# Patient Record
Sex: MALE | Race: WHITE | Employment: OTHER | URBAN - METROPOLITAN AREA
[De-identification: names, ages, dates, MRNs, and addresses within clinical notes are randomized per-mention and may not be internally consistent; named-entity substitution may affect disease eponyms.]

---

## 2023-02-10 ENCOUNTER — APPOINTMENT (OUTPATIENT)
Dept: GENERAL RADIOLOGY | Age: 28
End: 2023-02-10
Payer: COMMERCIAL

## 2023-02-10 ENCOUNTER — APPOINTMENT (OUTPATIENT)
Dept: CT IMAGING | Age: 28
End: 2023-02-10
Payer: COMMERCIAL

## 2023-02-10 ENCOUNTER — HOSPITAL ENCOUNTER (EMERGENCY)
Age: 28
Discharge: HOME | End: 2023-02-10
Attending: EMERGENCY MEDICINE
Payer: COMMERCIAL

## 2023-02-10 VITALS
SYSTOLIC BLOOD PRESSURE: 130 MMHG | WEIGHT: 165 LBS | RESPIRATION RATE: 21 BRPM | DIASTOLIC BLOOD PRESSURE: 73 MMHG | TEMPERATURE: 97.8 F | HEART RATE: 63 BPM | OXYGEN SATURATION: 98 %

## 2023-02-10 DIAGNOSIS — S80.851A FOREIGN BODY IN RIGHT LOWER EXTREMITY, INITIAL ENCOUNTER: ICD-10-CM

## 2023-02-10 DIAGNOSIS — T14.8XXA PUNCTURE WOUND: Primary | ICD-10-CM

## 2023-02-10 LAB
ANION GAP SERPL CALCULATED.3IONS-SCNC: 11 MMOL/L (ref 7–16)
BASOPHILS ABSOLUTE: 0.02 E9/L (ref 0–0.2)
BASOPHILS RELATIVE PERCENT: 0.3 % (ref 0–2)
BUN BLDV-MCNC: 11 MG/DL (ref 6–20)
CALCIUM SERPL-MCNC: 9 MG/DL (ref 8.6–10.2)
CHLORIDE BLD-SCNC: 103 MMOL/L (ref 98–107)
CO2: 25 MMOL/L (ref 22–29)
CREAT SERPL-MCNC: 0.9 MG/DL (ref 0.7–1.2)
EOSINOPHILS ABSOLUTE: 0.03 E9/L (ref 0.05–0.5)
EOSINOPHILS RELATIVE PERCENT: 0.4 % (ref 0–6)
GFR SERPL CREATININE-BSD FRML MDRD: >60 ML/MIN/1.73
GLUCOSE BLD-MCNC: 106 MG/DL (ref 74–99)
HCT VFR BLD CALC: 40 % (ref 37–54)
HEMOGLOBIN: 14.2 G/DL (ref 12.5–16.5)
IMMATURE GRANULOCYTES #: 0.02 E9/L
IMMATURE GRANULOCYTES %: 0.3 % (ref 0–5)
LYMPHOCYTES ABSOLUTE: 1.02 E9/L (ref 1.5–4)
LYMPHOCYTES RELATIVE PERCENT: 13.1 % (ref 20–42)
MCH RBC QN AUTO: 31.2 PG (ref 26–35)
MCHC RBC AUTO-ENTMCNC: 35.5 % (ref 32–34.5)
MCV RBC AUTO: 87.9 FL (ref 80–99.9)
MONOCYTES ABSOLUTE: 0.57 E9/L (ref 0.1–0.95)
MONOCYTES RELATIVE PERCENT: 7.3 % (ref 2–12)
NEUTROPHILS ABSOLUTE: 6.14 E9/L (ref 1.8–7.3)
NEUTROPHILS RELATIVE PERCENT: 78.6 % (ref 43–80)
PDW BLD-RTO: 12.4 FL (ref 11.5–15)
PLATELET # BLD: 143 E9/L (ref 130–450)
PMV BLD AUTO: 10.2 FL (ref 7–12)
POTASSIUM SERPL-SCNC: 3.6 MMOL/L (ref 3.5–5)
RBC # BLD: 4.55 E12/L (ref 3.8–5.8)
SODIUM BLD-SCNC: 139 MMOL/L (ref 132–146)
WBC # BLD: 7.8 E9/L (ref 4.5–11.5)

## 2023-02-10 PROCEDURE — 90714 TD VACC NO PRESV 7 YRS+ IM: CPT | Performed by: EMERGENCY MEDICINE

## 2023-02-10 PROCEDURE — 73560 X-RAY EXAM OF KNEE 1 OR 2: CPT

## 2023-02-10 PROCEDURE — 80048 BASIC METABOLIC PNL TOTAL CA: CPT

## 2023-02-10 PROCEDURE — 90471 IMMUNIZATION ADMIN: CPT | Performed by: EMERGENCY MEDICINE

## 2023-02-10 PROCEDURE — 6360000004 HC RX CONTRAST MEDICATION: Performed by: RADIOLOGY

## 2023-02-10 PROCEDURE — 85025 COMPLETE CBC W/AUTO DIFF WBC: CPT

## 2023-02-10 PROCEDURE — 36415 COLL VENOUS BLD VENIPUNCTURE: CPT

## 2023-02-10 PROCEDURE — 10120 INC&RMVL FB SUBQ TISS SMPL: CPT

## 2023-02-10 PROCEDURE — 96365 THER/PROPH/DIAG IV INF INIT: CPT

## 2023-02-10 PROCEDURE — 99285 EMERGENCY DEPT VISIT HI MDM: CPT

## 2023-02-10 PROCEDURE — 2500000003 HC RX 250 WO HCPCS: Performed by: EMERGENCY MEDICINE

## 2023-02-10 PROCEDURE — 2580000003 HC RX 258: Performed by: EMERGENCY MEDICINE

## 2023-02-10 PROCEDURE — 73706 CT ANGIO LWR EXTR W/O&W/DYE: CPT

## 2023-02-10 PROCEDURE — 6360000002 HC RX W HCPCS: Performed by: EMERGENCY MEDICINE

## 2023-02-10 PROCEDURE — 96375 TX/PRO/DX INJ NEW DRUG ADDON: CPT

## 2023-02-10 PROCEDURE — 2500000003 HC RX 250 WO HCPCS

## 2023-02-10 RX ORDER — FENTANYL CITRATE 0.05 MG/ML
50 INJECTION, SOLUTION INTRAMUSCULAR; INTRAVENOUS ONCE
Status: DISCONTINUED | OUTPATIENT
Start: 2023-02-10 | End: 2023-02-11 | Stop reason: HOSPADM

## 2023-02-10 RX ORDER — KETAMINE HYDROCHLORIDE 10 MG/ML
1 INJECTION INTRAMUSCULAR; INTRAVENOUS ONCE
Status: COMPLETED | OUTPATIENT
Start: 2023-02-10 | End: 2023-02-10

## 2023-02-10 RX ORDER — LIDOCAINE HYDROCHLORIDE 10 MG/ML
4 INJECTION, SOLUTION EPIDURAL; INFILTRATION; INTRACAUDAL; PERINEURAL ONCE
Status: COMPLETED | OUTPATIENT
Start: 2023-02-10 | End: 2023-02-10

## 2023-02-10 RX ORDER — OXYCODONE AND ACETAMINOPHEN 7.5; 325 MG/1; MG/1
1 TABLET ORAL EVERY 8 HOURS PRN
Qty: 6 TABLET | Refills: 0 | Status: SHIPPED | OUTPATIENT
Start: 2023-02-10 | End: 2023-02-13

## 2023-02-10 RX ORDER — CIPROFLOXACIN 500 MG/1
500 TABLET, FILM COATED ORAL 2 TIMES DAILY
Qty: 14 TABLET | Refills: 0 | Status: SHIPPED | OUTPATIENT
Start: 2023-02-10 | End: 2023-02-17

## 2023-02-10 RX ORDER — CEPHALEXIN 500 MG/1
500 CAPSULE ORAL 4 TIMES DAILY
Qty: 40 CAPSULE | Refills: 0 | Status: SHIPPED | OUTPATIENT
Start: 2023-02-10 | End: 2023-02-20

## 2023-02-10 RX ORDER — MORPHINE SULFATE 5 MG/ML
5 INJECTION, SOLUTION INTRAMUSCULAR; INTRAVENOUS ONCE
Status: COMPLETED | OUTPATIENT
Start: 2023-02-10 | End: 2023-02-10

## 2023-02-10 RX ORDER — CIPROFLOXACIN 2 MG/ML
400 INJECTION, SOLUTION INTRAVENOUS ONCE
Status: COMPLETED | OUTPATIENT
Start: 2023-02-10 | End: 2023-02-10

## 2023-02-10 RX ORDER — LIDOCAINE HYDROCHLORIDE 10 MG/ML
INJECTION, SOLUTION EPIDURAL; INFILTRATION; INTRACAUDAL; PERINEURAL
Status: COMPLETED
Start: 2023-02-10 | End: 2023-02-10

## 2023-02-10 RX ADMIN — LIDOCAINE HYDROCHLORIDE 2 ML: 10 INJECTION, SOLUTION EPIDURAL; INFILTRATION; INTRACAUDAL; PERINEURAL at 20:38

## 2023-02-10 RX ADMIN — CLOSTRIDIUM TETANI TOXOID ANTIGEN (FORMALDEHYDE INACTIVATED) AND CORYNEBACTERIUM DIPHTHERIAE TOXOID ANTIGEN (FORMALDEHYDE INACTIVATED) 0.5 ML: 5; 2 INJECTION, SUSPENSION INTRAMUSCULAR at 15:53

## 2023-02-10 RX ADMIN — CEFAZOLIN 2000 MG: 2 INJECTION, POWDER, FOR SOLUTION INTRAMUSCULAR; INTRAVENOUS at 15:52

## 2023-02-10 RX ADMIN — KETAMINE HYDROCHLORIDE 74.8 MG: 10 INJECTION INTRAMUSCULAR; INTRAVENOUS at 20:33

## 2023-02-10 RX ADMIN — CIPROFLOXACIN 400 MG: 2 INJECTION, SOLUTION INTRAVENOUS at 20:50

## 2023-02-10 RX ADMIN — IOPAMIDOL 70 ML: 755 INJECTION, SOLUTION INTRAVENOUS at 15:53

## 2023-02-10 RX ADMIN — MORPHINE SULFATE 5 MG: 5 INJECTION, SOLUTION INTRAMUSCULAR; INTRAVENOUS at 15:53

## 2023-02-10 ASSESSMENT — PAIN - FUNCTIONAL ASSESSMENT
PAIN_FUNCTIONAL_ASSESSMENT: 0-10
PAIN_FUNCTIONAL_ASSESSMENT: NONE - DENIES PAIN
PAIN_FUNCTIONAL_ASSESSMENT: NONE - DENIES PAIN
PAIN_FUNCTIONAL_ASSESSMENT: 0-10

## 2023-02-10 ASSESSMENT — ENCOUNTER SYMPTOMS
NAUSEA: 0
ABDOMINAL PAIN: 0
VOMITING: 0
SORE THROAT: 0
BACK PAIN: 0
COUGH: 0
CHEST TIGHTNESS: 0
SHORTNESS OF BREATH: 0
WHEEZING: 0
DIARRHEA: 0

## 2023-02-10 ASSESSMENT — PAIN SCALES - GENERAL
PAINLEVEL_OUTOF10: 6
PAINLEVEL_OUTOF10: 5
PAINLEVEL_OUTOF10: 5
PAINLEVEL_OUTOF10: 7

## 2023-02-10 ASSESSMENT — PAIN DESCRIPTION - ORIENTATION
ORIENTATION: RIGHT
ORIENTATION: RIGHT

## 2023-02-10 ASSESSMENT — PAIN DESCRIPTION - LOCATION
LOCATION: LEG
LOCATION: LEG

## 2023-02-10 NOTE — ED NOTES
Alli up 50mcg fentanyl for patient. Went to administer, patient refused at this time. Patient states that \"the pain went away, so I don't need it anymore. \" I informed patient to let me know if pain returns. I wasted medication in pyxis with Dayna Martinez RN.      Alana Fitzpatrick RN  02/10/23 1207

## 2023-02-10 NOTE — PROGRESS NOTES
Radiology Procedure Waiver   Name: Dieter Camargo  : 1995  MRN: [id number]    Date:  2/10/23    Time: 3:20 PM EST    Benefits of immediately proceeding with Radiology exam(s) without pre-testing outweigh the risks or are not indicated as specified below and therefore the following is/are being waived:    [] Pregnancy test   [] Patients LMP on-time and regular.   [] Patient had Tubal Ligation or has other Contraception Device. [] Patient  is Menopausal or Premenarcheal.    [] Patient had Full or Partial Hysterectomy. [] Protocol for Iodine allergy    [] MRI Questionnaire     [x] BUN/Creatinine   [x] Patient age w/no hx of renal dysfunction. [] Patient on Dialysis. [] Recent Normal Labs.   Electronically signed by Tatianna Roth DO on 2/10/23 at 3:20 PM EST

## 2023-02-10 NOTE — ED PROVIDER NOTES
Chief complaint:  Right knee injury    HPI history provided by the patient  The patient presents here stating that he accidentally shot himself in the medial aspect of his right knee, proximal leg with a nail gun just prior to ER arrival, it is all the way buried up to the back of the nail and they were unable to get a hold of it and pull it out before coming in. Complains of pain diffusely to that proximal tibia, knee region. No active bleeding. No particular numbness or tingling or weakness to the foot. No other injuries. Unknown last tetanus shot. Review of Systems   Constitutional:  Negative for chills, diaphoresis, fatigue and fever. HENT:  Negative for congestion and sore throat. Respiratory:  Negative for cough, chest tightness, shortness of breath and wheezing. Cardiovascular:  Negative for chest pain, palpitations and leg swelling. Gastrointestinal:  Negative for abdominal pain, diarrhea, nausea and vomiting. Genitourinary:  Negative for dysuria, flank pain, frequency and urgency. Musculoskeletal:  Negative for arthralgias, back pain, gait problem, joint swelling, myalgias, neck pain and neck stiffness. Skin:  Positive for wound. Negative for rash. Neurological:  Negative for dizziness, seizures, syncope, weakness, light-headedness, numbness and headaches. All other systems reviewed and are negative. Physical Exam  Vitals and nursing note reviewed. Constitutional:       General: He is not in acute distress. Appearance: He is well-developed. He is not ill-appearing, toxic-appearing or diaphoretic. HENT:      Head: Normocephalic and atraumatic. Eyes:      General: No scleral icterus. Pupils: Pupils are equal, round, and reactive to light. Cardiovascular:      Rate and Rhythm: Normal rate and regular rhythm. Heart sounds: Normal heart sounds. No murmur heard. Pulmonary:      Effort: Pulmonary effort is normal. No respiratory distress.       Breath sounds: Normal breath sounds. No stridor, decreased air movement or transmitted upper airway sounds. No decreased breath sounds, wheezing, rhonchi or rales. Chest:      Chest wall: No tenderness. Abdominal:      General: Bowel sounds are normal. There is no distension. Palpations: Abdomen is soft. Tenderness: There is no abdominal tenderness. There is no right CVA tenderness, left CVA tenderness, guarding or rebound. Musculoskeletal:         General: Tenderness and signs of injury present. No swelling or deformity. Cervical back: Full passive range of motion without pain, normal range of motion and neck supple. No signs of trauma or rigidity. No spinous process tenderness or muscular tenderness. Normal range of motion. Right lower leg: No edema. Left lower leg: No edema. Comments: Small round metallic foreign body medial aspect left proximal tibia, knee region with some cloth under it, tightly adhered up to the skin with diffuse tenderness to the area with no skin tenting otherwise and no redness. Extreme pain with any attempted range of motion of the knee. The right foot and ankle neurovascular intact, he is moving all digits in the foot well with no difficulty. Patient neurovascular tact distally in all extremities. No other sign of acute bony or joint injury throughout the extremities. No active bleeding to the knee. Skin:     General: Skin is warm and dry. Coloration: Skin is not cyanotic, jaundiced, mottled or pale. Findings: No bruising, erythema or rash. Neurological:      General: No focal deficit present. Mental Status: He is alert and oriented to person, place, and time. GCS: GCS eye subscore is 4. GCS verbal subscore is 5. GCS motor subscore is 6. Cranial Nerves: Cranial nerves 2-12 are intact. No cranial nerve deficit. Sensory: Sensation is intact. Motor: Motor function is intact. Coordination: Coordination is intact. Coordination normal.        Procedures   -------------------------------- Conscious Sedation Procedure Note -----------------------------  Patient Name: Willian Rizvi   Medical Record Number: [id number]  Date: 2/10/23   Time: 8:41 PM EST   Room/Bed: 07/07    Indication: Foreign body right proximal tibia  Consent: I have discussed with the patient and/or the patient representative the indication, alternatives, and the possible risks and/or complications of the planned procedure and the anesthesia methods. The patient and/or patient representative appear to understand and agree to proceed. Physician Involvement: The attending physician was present and supervising this procedure. 2/10/23     Time: 2030 (pre-procedure start time)  Pre-Sedation Documentation and Exam: I have personally completed a history, physical exam & review of systems for this patient (see notes). Vital signs have been reviewed (see flow sheet for vitals). I have reviewed the patient's history and review of systems. Airway Assessment: normal, normal neck range of motion  Prior History of Anesthesia Complications: none  ASA Classification: Class 1 - A normal healthy patient  Sedation/ Anesthesia Plan: intravenous sedation  Medications Used: ketamine intravenously  Monitoring and Safety: The patient was placed on a cardiac monitor and vital signs, pulse oximetry and level of consciousness were continuously evaluated throughout the procedure. The patient was closely monitored until recovery from the medications was complete and the patient had returned to baseline status.  Respiratory therapy was on standby at all times during the procedure.    ----------------------------------- Post Conscious Sedation Note -----------------------------------    2/10/23     Time: 2010 (post-procedure stop time)  Post-Sedation Vital Signs: Vital signs were reviewed and were stable after the procedure (see flow sheet for vitals)       Post-Sedation Exam: Lungs: clear to auscultation bilaterally and Cardiovascular: regular rate and rhythm       Complications: none    Electronically Signed by: Marina Leigh DO     1 mg/kg ketamine given IV. Orthopedics removed the foreign body and does the wound dressings    MDM     History provided by: The patient  Social factors affecting care: None  Chronic conditions affecting care: None  Chart reviewed: None    Differential includes but not limited to: Skin foreign body, bone foreign body, bone fracture, joint disruption, arterial injury    Work up includes with interpretations: CTA right lower extremity, CBC, basic metabolic panel. CBC and basic metabolic panel unremarkable. CTA shows no acute arterial injury, does show foreign body into the proximal tibia. Advanced directive discussion: None    Treatment in ER: IV Rocephin, IV Cipro, IV ketamine, IV fluids, IV morphine, IV fentanyl    Consultations in ER: Orthopedics    Diagnosis and disposition: Skin and bone foreign body    ED Course as of 02/10/23 2228   Fri Feb 10, 2023   1843 Case discussed with Dr. Jeremias Anguiano, orthopedic resident for Dr. Esteban Banks, detailed overview given, they reviewed the CT with me in case, they would like to see the patient in the ER. [NC]   4141 Patient awake and alert, conversant, oriented x3. Outpatient follow-up discussed with him, he is not going to stay in the hospital.  The use of antibiotics as well as crutches with 50% weightbearing is all discussed. He will return if symptoms change or worsen. [NC]      ED Course User Index  [NC] Lesley Grace DO     Orthopedics recommending admission, patient despite lengthy conversation with orthopedics and recommendations is refusing to be admitted. Second option from orthopedics is adequate antibiotic coverage, crutches with 50% weightbearing and close outpatient follow-up.     ED Course as of 02/10/23 2229   Fri Feb 10, 2023   1843 Case discussed with Dr. Jeremias Anguiano, orthopedic resident for  Elisha Baez, detailed overview given, they reviewed the CT with me in case, they would like to see the patient in the ER. [NC]   2479 Patient awake and alert, conversant, oriented x3. Outpatient follow-up discussed with him, he is not going to stay in the hospital.  The use of antibiotics as well as crutches with 50% weightbearing is all discussed. He will return if symptoms change or worsen. [NC]      ED Course User Index  [NC] Tatianna Roth, DO       --------------------------------------------- PAST HISTORY ---------------------------------------------  Past Medical History:  has no past medical history on file. Past Surgical History:  has no past surgical history on file. Social History:  reports that he has been smoking cigarettes. He does not have any smokeless tobacco history on file. Family History: family history is not on file. The patients home medications have been reviewed. Allergies: Patient has no known allergies.     -------------------------------------------------- RESULTS -------------------------------------------------  Labs:  Results for orders placed or performed during the hospital encounter of 42/36/30   Basic Metabolic Panel   Result Value Ref Range    Sodium 139 132 - 146 mmol/L    Potassium 3.6 3.5 - 5.0 mmol/L    Chloride 103 98 - 107 mmol/L    CO2 25 22 - 29 mmol/L    Anion Gap 11 7 - 16 mmol/L    Glucose 106 (H) 74 - 99 mg/dL    BUN 11 6 - 20 mg/dL    Creatinine 0.9 0.7 - 1.2 mg/dL    Est, Glom Filt Rate >60 >=60 mL/min/1.73    Calcium 9.0 8.6 - 10.2 mg/dL   CBC with Auto Differential   Result Value Ref Range    WBC 7.8 4.5 - 11.5 E9/L    RBC 4.55 3.80 - 5.80 E12/L    Hemoglobin 14.2 12.5 - 16.5 g/dL    Hematocrit 40.0 37.0 - 54.0 %    MCV 87.9 80.0 - 99.9 fL    MCH 31.2 26.0 - 35.0 pg    MCHC 35.5 (H) 32.0 - 34.5 %    RDW 12.4 11.5 - 15.0 fL    Platelets 823 622 - 232 E9/L    MPV 10.2 7.0 - 12.0 fL    Neutrophils % 78.6 43.0 - 80.0 %    Immature Granulocytes % 0.3 0.0 - 5.0 %    Lymphocytes % 13.1 (L) 20.0 - 42.0 %    Monocytes % 7.3 2.0 - 12.0 %    Eosinophils % 0.4 0.0 - 6.0 %    Basophils % 0.3 0.0 - 2.0 %    Neutrophils Absolute 6.14 1.80 - 7.30 E9/L    Immature Granulocytes # 0.02 E9/L    Lymphocytes Absolute 1.02 (L) 1.50 - 4.00 E9/L    Monocytes Absolute 0.57 0.10 - 0.95 E9/L    Eosinophils Absolute 0.03 (L) 0.05 - 0.50 E9/L    Basophils Absolute 0.02 0.00 - 0.20 E9/L       Radiology:  XR KNEE RIGHT (1-2 VIEWS)   Final Result   Nail penetrating the proximal tibia posteriorly and medially. CTA LOWER EXTREMITY RIGHT W CONTRAST   Final Result   1. Nail penetrates medial soft tissue and posterior aspect of proximal tibia. 2. No evidence of arterial injury. XR KNEE RIGHT (1-2 VIEWS)    (Results Pending)       ------------------------- NURSING NOTES AND VITALS REVIEWED ---------------------------  Date / Time Roomed:  2/10/2023  2:56 PM  ED Bed Assignment:  07/07    The nursing notes within the ED encounter and vital signs as below have been reviewed. /73   Pulse 63   Temp 97.8 °F (36.6 °C)   Resp 21   Wt 165 lb (74.8 kg)   SpO2 98%   Oxygen Saturation Interpretation: Normal      ------------------------------------------ PROGRESS NOTES ------------------------------------------  I have spoken with the patient and discussed todays results, in addition to providing specific details for the plan of care and counseling regarding the diagnosis and prognosis. Their questions are answered at this time and they are agreeable with the plan. I discussed at length with them reasons for immediate return here for re evaluation. They will followup with primary care by calling their office tomorrow. --------------------------------- ADDITIONAL PROVIDER NOTES ---------------------------------  At this time the patient is without objective evidence of an acute process requiring hospitalization or inpatient management.   They have remained hemodynamically stable throughout their entire ED visit and are stable for discharge with outpatient follow-up. The plan has been discussed in detail and they are aware of the specific conditions for emergent return, as well as the importance of follow-up. New Prescriptions    CEPHALEXIN (KEFLEX) 500 MG CAPSULE    Take 1 capsule by mouth 4 times daily for 10 days    CIPROFLOXACIN (CIPRO) 500 MG TABLET    Take 1 tablet by mouth 2 times daily for 7 days    OXYCODONE-ACETAMINOPHEN (PERCOCET) 7.5-325 MG PER TABLET    Take 1 tablet by mouth every 8 hours as needed for Pain for up to 6 doses. Max Daily Amount: 3 tablets       Diagnosis:  1. Puncture wound    2. Foreign body in right lower extremity, initial encounter        Disposition:  Patient's disposition: Discharge to home  Patient's condition is stable.          Jelena Spangler DO  02/10/23 [id number]

## 2023-02-11 NOTE — DISCHARGE INSTRUCTIONS
50% weight bearing on right leg, use crutches for ambulation  Take antibiotics as prescribed  Take pain medications as prescribed  Keep wound covered as long as drainage is persisting  Follow up in clinic in 7 days for wound check and repeat evaluation  Return to the emergency department if you develop fever, chills, increased pain to the knee, redness, swelling, warmth, or decreased ability to bear weight

## 2023-02-11 NOTE — ED NOTES
Consent signed by patient for conscious sedation which was reviewed with patient by Dr. Alexys Perez, RN  02/10/23 1954

## 2023-02-11 NOTE — ED NOTES
Patient educated on crutch use and applying 50% WB to right leg. Patient demonstrated well.      Siobhan Knapp RN  02/10/23 6451

## 2023-02-11 NOTE — ED NOTES
Assumed care of patient. He is alert and oriented x4 and able to make his needs/wants known. Wife at the bedside. Explained to him that I would be setting him up for his conscious sedation for removal of his FB ie:  Heart monitor, O2, IV etc.  He verbalized understanding and is in good spirits.       Ifrah Hatch RN  02/10/23 [address] Chinyere Rosas RN  02/10/23 9633

## 2023-02-11 NOTE — ED NOTES
Consent reviewed by ortho resident with patient. Signatures obtained for Removal of FB to right leg. Patient verbalized understanding of risks/benefits.      Rafa Yusuf RN  02/10/23 2014

## 2023-02-11 NOTE — CONSULTS
Department of Orthopedic Surgery  Resident Consult Note          Reason for Consult: Right knee pain    HISTORY OF PRESENT ILLNESS:       Patient is a 24 y.o. male who presents with complaint of right knee pain. He was using a framing nail, nail gun which he excellently discharged into his right leg near the knee. He had immediate pain although he was able to bear weight after the incident. Most of his pain was with flexion and extension of the knee. At that time he attempted to remove the nail with the assistance of those with him and they were unable to secondary to pain in the nail had to be embedded in the skin. He denies any previous injuries to the extremity. He denies any pain in any other extremities. He has no numbness or tingling in the involved extremity. No other complaints or concerns at this time. Past Medical History:    History reviewed. No pertinent past medical history. Past Surgical History:    History reviewed. No pertinent surgical history. Current Medications:   Current Facility-Administered Medications: fentaNYL (SUBLIMAZE) injection 50 mcg, 50 mcg, IntraVENous, Once  ketamine (KETALAR) injection 74.8 mg, 1 mg/kg, IntraVENous, Once  lidocaine PF 1 % injection, , ,   Allergies:  Patient has no known allergies. Social History:   TOBACCO:   reports that he has been smoking cigarettes. He does not have any smokeless tobacco history on file. ETOH:   has no history on file for alcohol use. DRUGS:   has no history on file for drug use. ACTIVITIES OF DAILY LIVING: Community ambulator  OCCUPATION:    Family History:   History reviewed. No pertinent family history.     REVIEW OF SYSTEMS:  CONSTITUTIONAL:  negative for  fevers, chills  EYES:  negative for blurred vision, visual disturbance  HEENT:  negative for  hearing loss, voice change  RESPIRATORY:  negative for  dyspnea, wheezing  CARDIOVASCULAR:  negative for  chest pain, palpitations  GASTROINTESTINAL:  negative for nausea, vomiting  GENITOURINARY:  negative for frequency, urinary incontinence  HEMATOLOGIC/LYMPHATIC:  negative for bleeding and petechiae  MUSCULOSKELETAL: See HPI  NEUROLOGICAL:  negative for headaches, dizziness  BEHAVIOR/PSYCH:  negative for increased agitation and anxiety    PHYSICAL EXAM:    VITALS:  BP (!) 144/74   Pulse 80   Temp 97.8 °F (36.6 °C)   Resp 25   Wt 165 lb (74.8 kg)   SpO2 100%   CONSTITUTIONAL:  awake, alert, cooperative, no apparent distress, and appears stated age    MUSCULOSKELETAL:  Right lower Extremity:  Nail head is partially visualized over the medial aspect of the proximal tibia, just distal to the knee joint, there is incarcerated pal material between the nail and the skin  No active bleeding  No knee effusion present  Tenderness to palpation over the region of the proximal tibia as well as the nail head  No tenderness to palpation elsewhere about the extremity  Motor function demonstrated to EHL, FHL, tibialis anterior, gastrocsoleus complex  Sensation intact to sural, saphenous, tibial, deep peroneal, and superficial peroneal nerve distributions which patient states is equal to the other side  Posterior tibial and dorsalis pedis pulses are present      Secondary Exam:   bilateralUE: No obvious signs of trauma. -TTP to fingers, hand, wrist, forearm, elbow, humerus, shoulder or clavicle. -- Patient able to flex/extend fingers, wrist, elbow and shoulder with active and passive ROM without pain, +2/4 Radial pulse, cap refill <3sec, +AIN/PIN/Radial/Ulnar/Median N, distal sensation grossly intact to C4-T1 dermatomes, compartments soft and compressible. leftLE: No obvious signs of trauma. -TTP to foot, ankle, leg, knee, thigh, hip.-- Patient able to flex/extend toes, ankle, knee and hip with active and passive ROM without pain,+2/4 DP & PT pulses, cap refill <3sec, +5/5 PF/DF/EHL, distal sensation grossly intact to L4-S1 dermatomes, compartments soft and compressible.     Pelvis: -TTP, -Log roll, -Heel strike     DATA:    CBC:   Lab Results   Component Value Date/Time    WBC 7.8 02/10/2023 03:41 PM    RBC 4.55 02/10/2023 03:41 PM    HGB 14.2 02/10/2023 03:41 PM    HCT 40.0 02/10/2023 03:41 PM    MCV 87.9 02/10/2023 03:41 PM    MCH 31.2 02/10/2023 03:41 PM    MCHC 35.5 02/10/2023 03:41 PM    RDW 12.4 02/10/2023 03:41 PM     02/10/2023 03:41 PM    MPV 10.2 02/10/2023 03:41 PM     PT/INR:  No results found for: PROTIME, INR    Radiology Review:  Plain film radiographs and CT imaging reviewed of the right knee. These images demonstrate a nail with entrance at the posterior aspect of the medial tibia which transverses both cortices exiting the posterolateral lateral aspect of the tibia with a slight proximal to distal trajectory. There is no involvement of the vascular structures. Fracture is noted only in the area of the hip where the nail was transversing with no fractures propagating across the plateau or distal.    IMPRESSION:  Foreign body, right knee, involving the proximal tibial plateau with associated traumatic arthrotomy    PLAN:  Ancef 2 g provided in emergency department, tetanus was brought up-to-date  Physical exam and imaging findings were discussed with patient. We have started antibiotic therapy to prevent infection. We discussed the intra-articular nature of this foreign body which would necessitate a formal irrigation and debridement, likely arthroscopic for further efforts to prevent any infection. We also discussed that an attempt to remove the nail and take pressure off the skin on the medial side of the knee in the emergency department under sedation. He was agreeable to proceed with this procedure and was advised of the risks of injuries to any vascular structures, nerves, pain, and greatest risk was concerned that we may fail to remove the nail despite our efforts. He was agreeable to proceed. Sedation was provided by the emergency department.   After patient was sedated, the remaining portion of jeans was removed from around the nail head. With the nail had been exposed, vice  were used to extract the nail. At this time venous oozing was present from the wound. This was left open to close secondarily. Betadine was used around the area as well as irrigation with saline solution. A soft dressing was applied with an Ace wrap. Given the location of this puncture wound and the nail on both radiographs and CT, this represents a traumatic arthrotomy of the knee. We discussed the need for both antibiotics as well as arthroscopic irrigation and debridement to help prevent infection. He is at risk of developing a septic knee. It was explained to him that this could result in the development of a rapid arthritis for his knee and has a potential to cause systemic illness. The patient was not willing to stay for this and ultimately left AGAINST MEDICAL ADVICE. In light of this we suggest that he is only 50% weightbearing with crutches given the proximity of his injury to the joint surface. He was given precautions regarding follow-up, with strict instructions to return to the emergency department if any signs of infection were to arise. These were explained to him in detail. He will otherwise follow-up with Dr. Godfrey Suggs in 7 days for repeat evaluation. He was provided antibiotics deemed appropriate for this injury after discussion with pharmacy and the emergency department.   Discussed with attending